# Patient Record
Sex: MALE | Employment: UNEMPLOYED | URBAN - METROPOLITAN AREA
[De-identification: names, ages, dates, MRNs, and addresses within clinical notes are randomized per-mention and may not be internally consistent; named-entity substitution may affect disease eponyms.]

---

## 2017-01-02 ENCOUNTER — TELEPHONE (OUTPATIENT)
Dept: SURGERY | Facility: CLINIC | Age: 62
End: 2017-01-02

## 2017-01-02 NOTE — TELEPHONE ENCOUNTER
Pre Visit Call and Assessment    Date of call:  01/02/2017    Phone numbers:  Home number on file [phone] (home)    Reached patient/confirmed appointment:  Yes  Patient care team/Primary provider:  Unknown, Doctor  Preferred outpatient pharmacy:    [facility] - [place], [place] - [address]  [address]  [address]  [facility] [address]  Phone: [phone] Fax: [phone]    Referred to:  Dr. Kennedy Leyva    Reason for visit: left inguinal hernia    Problem List:    Patient Active Problem List   Diagnosis     Obesity with body mass index 30 or greater     Acquired immunodeficiency syndrome (H)     Albuminuria     Anxiety disorder     Carpal tunnel syndrome     Cataract     Spinal stenosis of cervical region     Type 2 diabetes mellitus (H)     Impotence of organic origin     Hypertension     Hyperlipidemia     Insomnia     Neuropathy (H)     Adiposity     Postherpetic neuralgia     Renal insufficiency     Retrograde ejaculation     Pain in right hip     Shoulder pain     History of arthrodesis     History of total knee replacement     Sensorineural hearing loss     Major depressive episode     Sleep apnea     Vitamin D deficiency       Allergies:     Allergies   Allergen Reactions     Fenofibrate      Other reaction(s): Rhabdomyolysis     Simvastatin      Other reaction(s): Rhabdomyolysis     Amlodipine Swelling     Ciprofloxacin      Sitagliptin      Other reaction(s): Constipation       History:      Past Medical History   Diagnosis Date     HIV infection (H)      DM type 2 (diabetes mellitus, type 2) (H)      Renal insufficiency      Erectile dysfunction        No past surgical history on file.    No family history on file.    Social History   Substance Use Topics     Smoking status: Never Smoker      Smokeless tobacco: Not on file     Alcohol Use: No       Patient instructions:    *  Bring outside medical records, images/disc, and/or studies

## 2017-01-03 ENCOUNTER — OFFICE VISIT (OUTPATIENT)
Dept: SURGERY | Facility: CLINIC | Age: 62
End: 2017-01-03

## 2017-01-03 VITALS
BODY MASS INDEX: 30.9 KG/M2 | DIASTOLIC BLOOD PRESSURE: 78 MMHG | HEIGHT: 75 IN | WEIGHT: 248.5 LBS | SYSTOLIC BLOOD PRESSURE: 137 MMHG | HEART RATE: 69 BPM | TEMPERATURE: 98.4 F | OXYGEN SATURATION: 96 %

## 2017-01-03 DIAGNOSIS — K40.90 LEFT INGUINAL HERNIA: Primary | ICD-10-CM

## 2017-01-03 ASSESSMENT — ENCOUNTER SYMPTOMS
CHILLS: 1
EYE WATERING: 1
DISTURBANCES IN COORDINATION: 1
POLYPHAGIA: 1
EYE PAIN: 1
MUSCLE WEAKNESS: 1
PANIC: 1
EYE IRRITATION: 1
INSOMNIA: 1
HALLUCINATIONS: 0
FATIGUE: 1
NUMBNESS: 1
PARALYSIS: 1
DECREASED CONCENTRATION: 1
NERVOUS/ANXIOUS: 1
DOUBLE VISION: 1
ARTHRALGIAS: 1
DECREASED APPETITE: 0
SPEECH CHANGE: 1
HEADACHES: 1
MEMORY LOSS: 1
STIFFNESS: 1
INCREASED ENERGY: 1
MUSCLE CRAMPS: 1
ALTERED TEMPERATURE REGULATION: 1
WEIGHT GAIN: 0
NIGHT SWEATS: 1
JOINT SWELLING: 1
DIZZINESS: 1
NECK PAIN: 1
DEPRESSION: 1
WEIGHT LOSS: 0
MYALGIAS: 1
FEVER: 1
EYE REDNESS: 1
BACK PAIN: 1
POLYDIPSIA: 0

## 2017-01-03 ASSESSMENT — PAIN SCALES - GENERAL: PAINLEVEL: NO PAIN (0)

## 2017-01-03 NOTE — PROGRESS NOTES
Aguilar Sosa is a 61 year old male undergoing w/u by Dr Maya for infertility found to have a LIH.  Hernia is asymptomatic.  Finding was not work related.  Onset did not occur with lifting.  Obstructive symptoms:  no  Urinary difficulties:  no  Chronic cough: no  Constipation:  no  Current level of activity:  Low    Past medical and surgical history, medications, allergies, family history, and social history were reviewed with the patient.    ROS: 10 point review of systems negative except noted in HPI  PHYSICAL EXAM  General appearance- healthy, alert, and in no distress.  Skin- Skin color, texture, and turgor normal.   Neck- Neck is supple with no obvious adenopathy.  Lungs- Respiratory effort unlabored.  Gait- uses cane for stability.  Abdomen - over weight soft non tender  Left inguinal hernia, right without defect.    Impression:  Inguinal hernia, left  Recommendation:  Watching waiting, return to see me if symptoms.    A full discussion regarding the alternatives, risks, goals, and potential complications for this surgery was completed today.  The patient understood that the potential problems included but are not limited to:  Infection, bleeding, hematoma, seroma, recurrence, injury to nerve/muscle or involved testicle, ischemic orchitis, and chronic pain.    The patient verbally expressed understanding, was given the opportunity for questions, and wants to hold off on surgery. He will see me should he develop pain or other inguinal issues.    The total time spent with this patient was 30 minutes.  Of this time, greater than 50% was spent counseling and coordinating care.

## 2017-01-03 NOTE — NURSING NOTE
"Chief Complaint   Patient presents with     Consult     consult       Filed Vitals:    01/03/17 1446   BP: 137/78   Pulse: 69   Temp: 98.4  F (36.9  C)   Height: 6' 3\"   Weight: 248 lb 8 oz   SpO2: 96%       Body mass index is 31.06 kg/(m^2).      Jah Porter MA                            "

## 2017-01-03 NOTE — Clinical Note
1/3/2017       RE: Aguilar Sosa  [address]  [facility] [address]     Dear Colleague,    Thank you for referring your patient, Aguilar Sosa, to the GENERAL SURGERY at [facility] Please see a copy of my visit note below.    Aguilar Sosa is a 61 year old male undergoing w/u by Dr Maya for infertility found to have a LIH.  Hernia is asymptomatic.  Finding was not work related.  Onset did not occur with lifting.  Obstructive symptoms:  no  Urinary difficulties:  no  Chronic cough: no  Constipation:  no  Current level of activity:  Low    Past medical and surgical history, medications, allergies, family history, and social history were reviewed with the patient.    ROS: 10 point review of systems negative except noted in HPI  PHYSICAL EXAM  General appearance- healthy, alert, and in no distress.  Skin- Skin color, texture, and turgor normal.   Neck- Neck is supple with no obvious adenopathy.  Lungs- Respiratory effort unlabored.  Gait- uses cane for stability.  Abdomen - over weight soft non tender  Left inguinal hernia, right without defect.    Impression:  Inguinal hernia, left  Recommendation:  Watching waiting, return to see me if symptoms.    A full discussion regarding the alternatives, risks, goals, and potential complications for this surgery was completed today.  The patient understood that the potential problems included but are not limited to:  Infection, bleeding, hematoma, seroma, recurrence, injury to nerve/muscle or involved testicle, ischemic orchitis, and chronic pain.    The patient verbally expressed understanding, was given the opportunity for questions, and wants to hold off on surgery. He will see me should he develop pain or other inguinal issues.    The total time spent with this patient was 30 minutes.  Of this time, greater than 50% was spent counseling and coordinating care.          Again, thank you for allowing me to participate in the  care of your patient.      Sincerely,    Kennedy Leyva MD

## 2017-01-12 ENCOUNTER — PRE VISIT (OUTPATIENT)
Dept: UROLOGY | Facility: CLINIC | Age: 62
End: 2017-01-12

## 2017-01-12 NOTE — TELEPHONE ENCOUNTER
Patient coming in to discuss semen analysis and labs. Labs completed for visit. Patient last saw Dr Maya on 12/16/16 for fertility testing. Records in [facility]. No need to call patient

## 2017-01-19 ENCOUNTER — OFFICE VISIT (OUTPATIENT)
Dept: UROLOGY | Facility: CLINIC | Age: 62
End: 2017-01-19

## 2017-01-19 VITALS
HEART RATE: 78 BPM | WEIGHT: 248 LBS | HEIGHT: 75 IN | SYSTOLIC BLOOD PRESSURE: 132 MMHG | BODY MASS INDEX: 30.84 KG/M2 | DIASTOLIC BLOOD PRESSURE: 78 MMHG

## 2017-01-19 DIAGNOSIS — Z31.41 FERTILITY TESTING: Primary | ICD-10-CM

## 2017-01-19 ASSESSMENT — PAIN SCALES - GENERAL: PAINLEVEL: NO PAIN (0)

## 2017-01-19 NOTE — Clinical Note
"1/19/2017      RE: Aguilar Sosa  [address]  [facility] [address]       Urology Clinic Note      Date: 1/19/2017  Time: 12:27 PM  Patient: Aguilar Sosa  MRN: [id number]    HPI/Subjective:   Mr. Aguilar Sosa is a 61 year old male who is being seen for follow up for infertility, and mainly anejaculation.  He has HIV with history of AIDS, diabetes, ED and anejaculatory orgasm. He had SA with RA done 12/16/16 (no antegrade specimen) showing no sperm, and discussed with him that his hormonal panel normal (reviewed in depth today).    Spironolactone does not appear to be affecting his testosterone level.    He was started on sildenafil for ED, and is back today to discuss results of SA. Patient reports that since he started using sildenaful he have started seeing some ejaculate with the orgasm, and is pretty satisfied with the result, although he thinks the erections could be firmer.     Left inguinal hernia noted on last visit.    ED/Vascular disease risk factors:   HTN: yes   Hyperlipidemia: yes   Smoking: no   DM: yes, DM II   Cardiovascular disease: None known   Meds associated with ED that he's taking: spironolactone may affect testosterone levels.   Anxiety/anger/depression:  No   Penile Plaques or curvature:  none.     Objective:  /78 mmHg  Pulse 78  Ht 1.905 m (6' 3\")  Wt 112.492 kg (248 lb)  BMI 31.00 kg/m2  Gen: In NAD, conversant  Resp: Breathing non-labored  CV: Extremities warm.   exam deferred today.    Assessment & Plan: Aguilar Sosa is a 61 year old male with anejaculation and infertility, started to see some ejaculation with sildenafil.  - consider EEJ/ IUI, but neither likely an option based on cost/insurance coverage.  - discussed that ED And ejaculatory dysfunction are likely related to DM.  I do not think they have coverage for ( or funds for) ART.       - will do an SA now that he has ejaculate     - told patient that he can go up on the sildenafil up to 100 g to get " better results with erection    - patient would like to set up teaching for ICI         This patient was seen & discussed with Dr Francesco Freed MD  Urology PGY2  [phone]      CC: GABRIELA Connors      I saw and examined the patient with the resident today.  I agree with the resident note and plan of care as above.   25min visit, over 50% face to face in counseling/discussion of above issues: hormone lab results, semen analysis results, and plan/options going forward for fertility workup.    Marcello Maya MD  Urology Staff     Marcello Maya MD

## 2017-01-19 NOTE — PROGRESS NOTES
"Urology Clinic Note      Date: 1/19/2017  Time: 12:27 PM  Patient: Aguilar Sosa  MRN: [id number]    HPI/Subjective:   Mr. Aguilar Sosa is a 61 year old male who is being seen for follow up for infertility, and mainly anejaculation.  He has HIV with history of AIDS, diabetes, ED and anejaculatory orgasm. He had SA with RA done 12/16/16 (no antegrade specimen) showing no sperm, and discussed with him that his hormonal panel normal (reviewed in depth today).    Spironolactone does not appear to be affecting his testosterone level.    He was started on sildenafil for ED, and is back today to discuss results of SA. Patient reports that since he started using sildenaful he have started seeing some ejaculate with the orgasm, and is pretty satisfied with the result, although he thinks the erections could be firmer.     Left inguinal hernia noted on last visit.    ED/Vascular disease risk factors:   HTN: yes   Hyperlipidemia: yes   Smoking: no   DM: yes, DM II   Cardiovascular disease: None known   Meds associated with ED that he's taking: spironolactone may affect testosterone levels.   Anxiety/anger/depression:  No   Penile Plaques or curvature:  none.     Objective:  /78 mmHg  Pulse 78  Ht 1.905 m (6' 3\")  Wt 112.492 kg (248 lb)  BMI 31.00 kg/m2  Gen: In NAD, conversant  Resp: Breathing non-labored  CV: Extremities warm.   exam deferred today.    Assessment & Plan: Aguilar Sosa is a 61 year old male with anejaculation and infertility, started to see some ejaculation with sildenafil.  - consider EEJ/ IUI, but neither likely an option based on cost/insurance coverage.  - discussed that ED And ejaculatory dysfunction are likely related to DM.  I do not think they have coverage for ( or funds for) ART.       - will do an SA now that he has ejaculate     - told patient that he can go up on the sildenafil up to 100 g to get better results with erection    - patient would like to set up teaching for ICI     "     This patient was seen & discussed with Dr Francesco Freed MD  Urology PGY2  [phone]      CC: GABRIELA Connors      I saw and examined the patient with the resident today.  I agree with the resident note and plan of care as above.   25min visit, over 50% face to face in counseling/discussion of above issues: hormone lab results, semen analysis results, and plan/options going forward for fertility workup.    Marcello Maya MD  Urology Staff

## 2017-01-19 NOTE — Clinical Note
There are 4 clinics in the [facility] that do all aspects of advanced female infertility care:     1. [facility] - [place].  [email or web address].  Office in [place].     2. [facility] Reproductive Medicine [email or web address]  Offices in [facility].        3. [facility] for Reproductive Health [email or web address].  Office in [place].     4. [facility] [email or web address].  Office in [facility].

## 2017-01-19 NOTE — PATIENT INSTRUCTIONS
Schedule/complete semen analysis  Follow up with Lalitha Barrett for a injection teaching    It was a pleasure meeting with you today.  Thank you for allowing me and my team the privilege of caring for you today.  YOU are the reason we are here, and I truly hope we provided you with the excellent service you deserve.  Please let us know if there is anything else we can do for you so that we can be sure you are leaving completely satisfied with your care experience.

## 2017-01-19 NOTE — MR AVS SNAPSHOT
After Visit Summary   1/19/2017    Aguilar Sosa    MRN: [id number]           Patient Information     Date Of Birth          1955        Visit Information        Provider Department      1/19/2017 10:40 AM Marcello Maya MD [facility] Urology and [facility]        Today's Diagnoses     Fertility testing    -  1       Care Instructions    Schedule/complete semen analysis  Follow up with Lalitha Barrett for a injection teaching    It was a pleasure meeting with you today.  Thank you for allowing me and my team the privilege of caring for you today.  YOU are the reason we are here, and I truly hope we provided you with the excellent service you deserve.  Please let us know if there is anything else we can do for you so that we can be sure you are leaving completely satisfied with your care experience.                Follow-ups after your visit        Your next 10 appointments already scheduled     Jan 25, 2017 10:00 AM   (Arrive by 9:45 AM)   Return Visit with  Prostate Cancer Ctr Nurse   [facility] Urology and [facility] ([facility])    [address] [place] [id number] [phone]              Future tests that were ordered for you today     Open Future Orders        Priority Expected Expires Ordered    Semen/Sperm Cryo w/Morphology(ROSA) Routine  2/18/2017 1/19/2017    Retrograde Semen Analysis (ROSA) Routine 1/26/2017 3/21/2017 1/19/2017            Who to contact     Please call your clinic at [phone] to:    Ask questions about your health    Make or cancel appointments    Discuss your medicines    Learn about your test results    Speak to your doctor   If you have compliments or concerns about an experience at your clinic, or if you wish to file a complaint, please contact [facility] Patient Relations at [phone] or email us at  "[email or web address]         Additional Information About Your Visit        [employer] Information     S[employer]pping is an electronic gateway that provides easy, online access to your medical records. With Sift [employer]pping, you can request a clinic appointment, read your test results, renew a prescription or communicate with your care team.     To sign up for [employer]pping visit the website at [email or web address]   You will be asked to enter the access code listed below, as well as some personal information. Please follow the directions to create your username and password.     Your access code is: [email or web address]  Expires: 3/16/2017 11:39 AM     Your access code will  in 90 days. If you need help or a new code, please contact your [facility] or call [phone] for assistance.        Care EveryWhere ID     This is your Care EveryWhere ID. This could be used by other organizations to access your [place] medical records  [id number]        Your Vitals Were     Pulse Height BMI (Body Mass Index)             78 1.905 m (6' 3\") 31.00 kg/m2          Blood Pressure from Last 3 Encounters:   17 132/78   17 137/78   16 126/76    Weight from Last 3 Encounters:   17 112.492 kg (248 lb)   17 112.719 kg (248 lb 8 oz)   16 108.863 kg (240 lb)               Primary Care Provider    Doctor Unknown, MD       No address on file        Thank you!     Thank you for choosing [facility] UROLOGY AND [facility] FOR PROSTATE AND UROLOGIC CANCERS  for your care. Our goal is always to provide you with excellent care. Hearing back from our patients is one way we can continue to improve our services. Please take a few minutes to complete the written survey that you may receive in the mail after your visit with us. Thank you!             Your Updated Medication List - Protect others around you: Learn how to safely use, store and throw away your medicines at www.disposemymeds.org.    "       This list is accurate as of: 1/19/17 11:39 AM.  Always use your most recent med list.                   Brand Name Dispense Instructions for use    ACCU-CHEK PEDRITO PLUS test strip   Generic drug:  blood glucose monitoring          acetaminophen 325 MG tablet    TYLENOL     Take 650 mg by mouth       aspirin 81 MG EC tablet      Take 81 mg by mouth       atorvastatin 10 MG tablet    LIPITOR     Take 10 mg by mouth       blood glucose monitoring lancets          carvedilol 12.5 MG tablet    COREG     Take 12.5 mg by mouth       cholecalciferol 1000 UNIT tablet    vitamin D     Take 1,000 Units by mouth       dorzolamide-timolol 2-0.5 % ophthalmic solution    COSOPT         gabapentin 300 MG capsule    NEURONTIN     Take 900 mg by mouth       GENVOYA [id number] MG Tabs per tablet   Generic drug:  Xkdfcxd-Ifzxt-Yihfgllr-TenofAF          ibuprofen 400 MG tablet    ADVIL/MOTRIN     Take 400 mg by mouth       lisinopril 10 MG tablet    PRINIVIL/ZESTRIL     Take 10 mg by mouth       metFORMIN 1000 MG tablet    GLUCOPHAGE     Take 1,000 mg by mouth       oxyCODONE 5 MG IR tablet    ROXICODONE     Take 5 mg by mouth       sildenafil 20 MG tablet    REVATIO/VIAGRA    30 tablet    Take 3 tablets (60 mg) by mouth daily as needed May increase up to 100mg dose ( 5 tablets) if needed.  Use lowest effective dose.       spironolactone 25 MG tablet    ALDACTONE     Take 50 mg by mouth       tadalafil 20 MG tablet    CIALIS     Take 10 mg by mouth       TRIXAICIN 0.025 % cream   Generic drug:  capsicum oleoresin

## 2017-01-26 ENCOUNTER — ALLIED HEALTH/NURSE VISIT (OUTPATIENT)
Dept: UROLOGY | Facility: CLINIC | Age: 62
End: 2017-01-26

## 2017-01-26 DIAGNOSIS — N52.9 IMPOTENCE OF ORGANIC ORIGIN: Primary | ICD-10-CM

## 2017-01-26 NOTE — MR AVS SNAPSHOT
After Visit Summary   1/26/2017    Aguilar Sosa    MRN: [id number]           Patient Information     Date Of Birth          1955        Visit Information        Provider Department      1/26/2017 9:30 AM Nurse, [facility] Urology and [facility] for Prostate and Urologic Cancers        Today's Diagnoses     Impotence of organic origin    -  1       Care Instructions    Please call to let me know how long the erection lasted     A prescription will be sent to [facility] as discussed    Lalitha Barrett, RN   Care Coordinator Urology          Follow-ups after your visit        Follow-up notes from your care team     Return in about 4 weeks (around 2/23/2017) for review results of semen analysis and see how injection teaching is going.      Your next 10 appointments already scheduled     Jan 30, 2017 11:00 AM   LAB with UR ANDROLOGY LAB   [facility] ([facility])    [address] [place] [phone]              Patient must bring picture ID.  Patient should be prepared to give a urine specimen  Please do not eat 10-12 hours before your appointment if you are coming in fasting for labs on lipids, cholesterol, or glucose (sugar).  Pregnant women should follow their Care Team instructions. Water with medications is okay. Do not drink coffee or other fluids.   If you have concerns about taking  your medications, please ask at office or if scheduling via [employer], send a message by clicking on Secure Messaging, Message Your Care Team.              Who to contact     Please call your clinic at [phone] to:    Ask questions about your health    Make or cancel appointments    Discuss your medicines    Learn about your test results    Speak to your doctor   If you have compliments or concerns about an experience at your clinic, or if you wish to file a complaint, please contact [facility]  Physicians Patient Relations at [phone] or email us at [email or web address]         Additional Information About Your Visit        [employer] Information     HQ plus is an electronic gateway that provides easy, online access to your medical records. With HQ plus, you can request a clinic appointment, read your test results, renew a prescription or communicate with your care team.     To sign up for HQ plus visit the website at [email or web address]   You will be asked to enter the access code listed below, as well as some personal information. Please follow the directions to create your username and password.     Your access code is: [email or web address]  Expires: 3/16/2017 11:39 AM     Your access code will  in 90 days. If you need help or a new code, please contact your [facility] or call [phone] for assistance.        Care EveryWhere ID     This is your Care EveryWhere ID. This could be used by other organizations to access your [place] medical records  [id number]         Blood Pressure from Last 3 Encounters:   17 132/78   17 137/78   16 126/76    Weight from Last 3 Encounters:   17 112.492 kg (248 lb)   17 112.719 kg (248 lb 8 oz)   16 108.863 kg (240 lb)              We Performed the Following     INJECTION CORPORA CAVERNOSA W PHRM AGNT        Primary Care Provider    Doctor Unknown, MD       No address on file        Thank you!     Thank you for choosing [facility] UROLOGY AND [facility] FOR PROSTATE AND UROLOGIC CANCERS  for your care. Our goal is always to provide you with excellent care. Hearing back from our patients is one way we can continue to improve our services. Please take a few minutes to complete the written survey that you may receive in the mail after your visit with us. Thank you!             Your Updated Medication List - Protect others around you: Learn how to safely use, store and throw away your  medicines at www.disposemymeds.org.          This list is accurate as of: 1/26/17 11:59 PM.  Always use your most recent med list.                   Brand Name Dispense Instructions for use    ACCU-CHEK PEDRITO PLUS test strip   Generic drug:  blood glucose monitoring          acetaminophen 325 MG tablet    TYLENOL     Take 650 mg by mouth       aspirin 81 MG EC tablet      Take 81 mg by mouth       atorvastatin 10 MG tablet    LIPITOR     Take 10 mg by mouth       blood glucose monitoring lancets          carvedilol 12.5 MG tablet    COREG     Take 12.5 mg by mouth       cholecalciferol 1000 UNIT tablet    vitamin D     Take 1,000 Units by mouth       dorzolamide-timolol 2-0.5 % ophthalmic solution    COSOPT         gabapentin 300 MG capsule    NEURONTIN     Take 900 mg by mouth       GENVOYA [id number] MG Tabs per tablet   Generic drug:  Kctpkql-Lvugo-Cttqrzfx-TenofAF          ibuprofen 400 MG tablet    ADVIL/MOTRIN     Take 400 mg by mouth       lisinopril 10 MG tablet    PRINIVIL/ZESTRIL     Take 10 mg by mouth       metFORMIN 1000 MG tablet    GLUCOPHAGE     Take 1,000 mg by mouth       oxyCODONE 5 MG IR tablet    ROXICODONE     Take 5 mg by mouth       sildenafil 20 MG tablet    REVATIO/VIAGRA    30 tablet    Take 3 tablets (60 mg) by mouth daily as needed May increase up to 100mg dose ( 5 tablets) if needed.  Use lowest effective dose.       spironolactone 25 MG tablet    ALDACTONE     Take 50 mg by mouth       tadalafil 20 MG tablet    CIALIS     Take 10 mg by mouth       TRIXAICIN 0.025 % cream   Generic drug:  capsicum oleoresin

## 2017-01-27 NOTE — PATIENT INSTRUCTIONS
Please call to let me know how long the erection lasted     A prescription will be sent to [facility] as discussed    Lalitha Barrett, RITU   Care Coordinator Urology

## 2017-01-27 NOTE — NURSING NOTE
Chief Complaint   Patient presents with     Clinic Care Coordination - Face To Face     injection teaching       Patient Active Problem List   Diagnosis     Obesity with body mass index 30 or greater     Acquired immunodeficiency syndrome (H)     Albuminuria     Anxiety disorder     Carpal tunnel syndrome     Cataract     Spinal stenosis of cervical region     Type 2 diabetes mellitus (H)     Impotence of organic origin     Hypertension     Hyperlipidemia     Insomnia     Neuropathy (H)     Adiposity     Postherpetic neuralgia     Renal insufficiency     Retrograde ejaculation     Pain in right hip     Shoulder pain     History of arthrodesis     History of total knee replacement     Sensorineural hearing loss     Major depressive episode     Sleep apnea     Vitamin D deficiency       Allergies   Allergen Reactions     Fenofibrate      Other reaction(s): Rhabdomyolysis     Simvastatin      Other reaction(s): Rhabdomyolysis     Amlodipine Swelling     Ciprofloxacin      Sitagliptin      Other reaction(s): Constipation       Current Outpatient Prescriptions   Medication Sig Dispense Refill     blood glucose monitoring (ACCU-CHEK PEDRITO PLUS) test strip        blood glucose monitoring (SOFTCLIX) lancets        acetaminophen (TYLENOL) 325 MG tablet Take 650 mg by mouth       aspirin 81 MG EC tablet Take 81 mg by mouth       atorvastatin (LIPITOR) 10 MG tablet Take 10 mg by mouth       carvedilol (COREG) 12.5 MG tablet Take 12.5 mg by mouth       cholecalciferol (VITAMIN D3) 1000 UNIT tablet Take 1,000 Units by mouth       dorzolamide-timolol (COSOPT) 2-0.5 % ophthalmic solution        gabapentin (NEURONTIN) 300 MG capsule Take 900 mg by mouth       GENVOYA 041-125-580-10 mg tablet        ibuprofen (ADVIL/MOTRIN) 400 MG tablet Take 400 mg by mouth       lisinopril (PRINIVIL/ZESTRIL) 10 MG tablet Take 10 mg by mouth       metFORMIN (GLUCOPHAGE) 1000 MG tablet Take 1,000 mg by mouth       oxyCODONE (ROXICODONE) 5 MG IR  tablet Take 5 mg by mouth       spironolactone (ALDACTONE) 25 MG tablet Take 50 mg by mouth       tadalafil (CIALIS) 20 MG tablet Take 10 mg by mouth       capsicum oleoresin (TRIXAICIN) 0.025 % cream        sildenafil (REVATIO/VIAGRA) 20 MG tablet Take 3 tablets (60 mg) by mouth daily as needed May increase up to 100mg dose ( 5 tablets) if needed.  Use lowest effective dose. 30 tablet 12       Social History   Substance Use Topics     Smoking status: Never Smoker      Smokeless tobacco: Not on file     Alcohol Use: No       Aguilar Sosa presents to clinic for Edex injection teaching.  Edex video watched in clinic by patient.  Reviewed consent and possible side effects.  Questions answered appropriately.  Patient injected 10 mcg with  assistance.  Patient rates his erection a 9/10.  Patient to call if he has any questions or concerns.  Prescription for Edex 10 mcg .  Aguilar Sosa comes into clinic today at the request of Marcello Maya .        This service provided today was under the direct supervision of dr Marcello Maya, who was available if needed.    Lalitha Barrett, RN    Lalitha Barrett, RN  1/27/2017  6:54 AM

## 2017-01-30 DIAGNOSIS — B20 HUMAN IMMUNODEFICIENCY VIRUS (HIV) DISEASE (H): ICD-10-CM

## 2017-01-30 DIAGNOSIS — N52.8 OTHER MALE ERECTILE DYSFUNCTION: ICD-10-CM

## 2017-01-30 DIAGNOSIS — B20: ICD-10-CM

## 2017-01-30 DIAGNOSIS — Z31.41 FERTILITY TESTING: ICD-10-CM

## 2017-01-30 DIAGNOSIS — N53.19 ANEJACULATION: ICD-10-CM

## 2017-01-30 DIAGNOSIS — K40.90 LEFT INGUINAL HERNIA: ICD-10-CM

## 2017-01-30 PROCEDURE — 89331 RETROGRADE EJACULATION ANAL: CPT

## 2017-01-30 PROCEDURE — 89322 SEMEN ANAL STRICT CRITERIA: CPT

## 2017-02-01 ENCOUNTER — TELEPHONE (OUTPATIENT)
Dept: UROLOGY | Facility: CLINIC | Age: 62
End: 2017-02-01

## 2017-02-03 LAB
ABNORMAL SPERM: 100 MORPHOLOGY
ABNORMAL SPERM: ABNORMAL MORPHOLOGY
ABSTINENCE DAYS: 10 DAYS (ref 2–7)
ABSTINENCE DAYS: 10 DAYS (ref 2–7)
AGGLUTINATION: ABNORMAL YES/NO
AGGLUTINATION: ABNORMAL YES/NO
ANALYSIS TEMP - CENTIGRADE: 22 CENTIGRADE
ANALYSIS TEMP - CENTIGRADE: 22 CENTIGRADE
CELL FRAGMENTS: 29 %
CELL FRAGMENTS: ABNORMAL %
COLLECTION METHOD: ABNORMAL
COLLECTION METHOD: ABNORMAL
COLLECTION SITE: ABNORMAL
COLLECTION SITE: ABNORMAL
CONSENT TO RELEASE TO PARTNER: YES
CONSENT TO RELEASE TO PARTNER: YES
HEAD DEFECT: 99
HEAD DEFECT: ABNORMAL
IMMATURE SPERM: 25 %
IMMATURE SPERM: ABNORMAL %
IMMOTILE: 86 %
IMMOTILE: 93 %
LAB RECEIPT TIME: ABNORMAL
LAB RECEIPT TIME: ABNORMAL
LIQUEFIED: ABNORMAL YES/NO
MIDPIECE DEFECT: 38
MIDPIECE DEFECT: ABNORMAL
NON-PROGRESSIVE MOTILITY: 4 %
NON-PROGRESSIVE MOTILITY: 5 %
NORMAL SPERM: 0 % NORMAL FORMS (ref 4–?)
NORMAL SPERM: ABNORMAL % NORMAL FORMS (ref 4–?)
PROGRESSIVE MOTILITY: 3 % (ref 32–?)
PROGRESSIVE MOTILITY: 9 % (ref 32–?)
ROUND CELLS: 3.4 MILLION/ML (ref ?–2)
ROUND CELLS: <0.1 MILLION/ML (ref ?–2)
SPECIMEN CONCENTRATION: 10.2 MILLION/ML (ref 15–?)
SPECIMEN CONCENTRATION: 555 MILLION/ML (ref 15–?)
SPECIMEN PH: 6.8 PH (ref 7.2–?)
SPECIMEN PH: 7.2 PH (ref 7.2–?)
SPECIMEN TYPE: ABNORMAL
SPECIMEN TYPE: ABNORMAL
SPECIMEN VOL UR: 0.7 ML (ref 1.5–?)
SPECIMEN VOL UR: 1 ML (ref 1.5–?)
TAIL DEFECT: 11
TAIL DEFECT: ABNORMAL
TIME OF ANALYSIS: ABNORMAL
TIME OF ANALYSIS: ABNORMAL
TOTAL NUMBER: 10.2 MILLION (ref 39–?)
TOTAL NUMBER: 388.5 MILLION (ref 39–?)
TOTAL PROGRESSIVE MOTILE: 0.31 MILLION (ref 15.6–?)
TOTAL PROGRESSIVE MOTILE: 35 MILLION (ref 15.6–?)
VISCOUS: ABNORMAL YES/NO
VITALITY: 21 % (ref 58–?)
VITALITY: ABNORMAL % (ref 58–?)
WBC SPECIMEN: 46 %
WBC SPECIMEN: ABNORMAL %

## 2017-02-06 ENCOUNTER — PRE VISIT (OUTPATIENT)
Dept: UROLOGY | Facility: CLINIC | Age: 62
End: 2017-02-06

## 2017-02-06 NOTE — TELEPHONE ENCOUNTER
Patient coming in for semen analysis results. SA completed 1/30/17. Records/orders in [facility]. No need to call patient

## 2017-02-09 ENCOUNTER — OFFICE VISIT (OUTPATIENT)
Dept: UROLOGY | Facility: CLINIC | Age: 62
End: 2017-02-09

## 2017-02-09 VITALS
WEIGHT: 248 LBS | DIASTOLIC BLOOD PRESSURE: 81 MMHG | SYSTOLIC BLOOD PRESSURE: 130 MMHG | HEART RATE: 68 BPM | BODY MASS INDEX: 31 KG/M2

## 2017-02-09 DIAGNOSIS — N52.9 ERECTILE DYSFUNCTION, UNSPECIFIED ERECTILE DYSFUNCTION TYPE: Primary | ICD-10-CM

## 2017-02-09 RX ORDER — IBUPROFEN 200 MG
TABLET ORAL
Qty: 20 EACH | Status: SHIPPED | OUTPATIENT
Start: 2017-02-09

## 2017-02-09 RX ORDER — SILDENAFIL CITRATE 20 MG/1
20-40 TABLET ORAL DAILY PRN
Qty: 30 TABLET | Refills: 12 | Status: SHIPPED | OUTPATIENT
Start: 2017-02-09

## 2017-02-09 RX ORDER — SILDENAFIL 100 MG/1
100 TABLET, FILM COATED ORAL DAILY PRN
Qty: 24 TABLET | Refills: 20 | Status: SHIPPED | OUTPATIENT
Start: 2017-02-09

## 2017-02-09 ASSESSMENT — PAIN SCALES - GENERAL: PAINLEVEL: NO PAIN (0)

## 2017-02-09 NOTE — PATIENT INSTRUCTIONS
Follow up in 1 year with Dr Maya    It was a pleasure meeting with you today.  Thank you for allowing me and my team the privilege of caring for you today.  YOU are the reason we are here, and I truly hope we provided you with the excellent service you deserve.  Please let us know if there is anything else we can do for you so that we can be sure you are leaving completely satisfied with your care experience.

## 2017-02-09 NOTE — PROGRESS NOTES
Quick Note:    Please notify pt of these results.  He did not have retrograde ejaculation.  Hid did have a lot of live sperm in the ejaculate- very adequate for IUI (intrauterine inseminations) with a female fertility specialist.  The percent of live sperm were lower, probably due to infrequent ejaculation.  We can consider a trans-rectal ultrasound also in clinic to evaluate for ejaculatory duct obstruction.    - thanks.  CHELLY      ______

## 2017-02-09 NOTE — PROGRESS NOTES
It was a pleasure to see Mr. Sosa back in Urology Clinic today.  He is a very nice, 61-year-old male with ejaculatory dysfunction secondary to diabetes.  He was able to ejaculate with the help of Viagra 100 mg.  He had a coupon initially for the generic version, and refills of this were prohibitively expensive.  I gave him prescriptions again with a coupon and also a 100 mg tablet prescription; he can consider sending this to an overseas pharmacy in [place].  He had some luck with the injectable Edex as well.  I also provided him with a generic prescription for this.  He can start at 0.3-0.4 mL and titrate up if needed.  He was able to do an antegrade semen analysis with the Viagra.  He lost most of the volume, but he did collect 0.7 mL.  This had a high concentration of 555 million per mL, low vitality and low motility, but total progressive motile was 35 million.  Morphology was zero.  There was not any evidence of any significant retrograde ejaculation.      I discussed with the couple that they may be able to have intercourse and achieve a pregnancy versus IUI, which is another good option.  She is going to follow up with [facility] on Monday toward this goal.  I advised female partner workup because of age 36.  Also, if they are interested in insemination, that would be a good idea/option.      I discussed with him again Viagra, including side effects and how to take it.  I discussed with him the intracavernosal injections and how to dose this and to use the lowest effective dose.  I would see him back in a year, sooner if needed.  It sounds like he was able to have a normal ejaculate volume, but just did not collect all that volume on the last semen analysis, so I feel that his ejaculatory dysfunction is secondary to diabetes more than anything, rather than ejaculatory duct obstruction.  In that case, we will hold off on a transrectal ultrasound.     15min visit, over 50% face to face in counseling/discussion of  erectile dysfunction/fertility.    Marcello Maya MD

## 2017-03-13 NOTE — TELEPHONE ENCOUNTER
[facility] Prior Authorization Team   Phone: [phone]  Fax: [phone]      PA Initiation    Medication: alprostadil (EDEX) 10 MCG kit  Insurance Company: [employer] - Phone [phone] Fax [phone]  Pharmacy Filling the Rx: "[facility] [id number] [place] [address] AT SEC OF [facility] [place]  Filling Pharmacy Phone: [phone]  Filling Pharmacy Fax: [phone]  Start Date: 3/13/2017

## 2017-03-14 ENCOUNTER — TRANSFERRED RECORDS (OUTPATIENT)
Dept: HEALTH INFORMATION MANAGEMENT | Facility: CLINIC | Age: 62
End: 2017-03-14

## 2017-03-16 NOTE — TELEPHONE ENCOUNTER
PRIOR AUTHORIZATION DENIED    Medication: alprostadil (EDEX) 10 MCG kit    Denial Date: 3/16/2017    Denial Rational:   PA denied stating ED drugs are excluded from coverage. An appeal is available and will require a letter of medical necessity.         Appeal Information:

## 2017-03-21 ENCOUNTER — TELEPHONE (OUTPATIENT)
Dept: UROLOGY | Facility: CLINIC | Age: 62
End: 2017-03-21

## 2017-03-21 NOTE — TELEPHONE ENCOUNTER
----- Message from Marcello Maya MD sent at 3/20/2017  2:18 PM CDT -----  Not sure who he talked to so far.  He can get another opinion:    There are 4 clinics in the [facility] that do all aspects of advanced female infertility care:     1. [facility] [place].  [email or web address].  Office in [place].     2. [email or web address]  Offices in [place] and [place].        3. [email or web address].  Office in [place].     4. [facility] [email or web address].  Office in [facility].       ----- Message -----     From: Lalitha Barrett RN     Sent: 3/20/2017   1:11 PM       To: Marcello Maya MD    Nir   Patient is calling to say he was told not use his sperm due to his HIV status. He is upset and wants another MD or clinic he can go to  Thoughts?  Lalitha

## 2017-12-18 ENCOUNTER — TRANSFERRED RECORDS (OUTPATIENT)
Dept: HEALTH INFORMATION MANAGEMENT | Facility: CLINIC | Age: 62
End: 2017-12-18

## 2018-02-19 ENCOUNTER — TRANSFERRED RECORDS (OUTPATIENT)
Dept: HEALTH INFORMATION MANAGEMENT | Facility: CLINIC | Age: 63
End: 2018-02-19

## 2018-10-11 ENCOUNTER — TRANSFERRED RECORDS (OUTPATIENT)
Dept: HEALTH INFORMATION MANAGEMENT | Facility: CLINIC | Age: 63
End: 2018-10-11

## 2018-11-20 ENCOUNTER — TRANSFERRED RECORDS (OUTPATIENT)
Dept: HEALTH INFORMATION MANAGEMENT | Facility: CLINIC | Age: 63
End: 2018-11-20

## 2019-08-06 ENCOUNTER — TRANSFERRED RECORDS (OUTPATIENT)
Dept: HEALTH INFORMATION MANAGEMENT | Facility: CLINIC | Age: 64
End: 2019-08-06

## 2019-11-21 ENCOUNTER — TRANSFERRED RECORDS (OUTPATIENT)
Dept: HEALTH INFORMATION MANAGEMENT | Facility: CLINIC | Age: 64
End: 2019-11-21

## 2019-11-21 ENCOUNTER — MEDICAL CORRESPONDENCE (OUTPATIENT)
Dept: HEALTH INFORMATION MANAGEMENT | Facility: CLINIC | Age: 64
End: 2019-11-21

## 2019-12-09 ENCOUNTER — TRANSFERRED RECORDS (OUTPATIENT)
Dept: HEALTH INFORMATION MANAGEMENT | Facility: CLINIC | Age: 64
End: 2019-12-09

## 2019-12-18 ENCOUNTER — TELEPHONE (OUTPATIENT)
Dept: NEUROSURGERY | Facility: CLINIC | Age: 64
End: 2019-12-18

## 2019-12-18 NOTE — TELEPHONE ENCOUNTER
[facility] Call Center    Phone Message    May a detailed message be left on voicemail: yes    Reason for Call: pt calling about the referral that his dr sent over. Pt was referred from Dr Clarence Anaya at [facility]. Please call to schedule    Action Taken: Message routed to:  [facility] Neurosurgery

## 2019-12-19 NOTE — TELEPHONE ENCOUNTER
Faxed request for records and imaging to [facility]. No referral or diagnosis is indicated in Epic.

## 2019-12-24 NOTE — TELEPHONE ENCOUNTER
[facility] Call Center    Phone Message    May a detailed message be left on voicemail: yes    Reason for Call: Other: pt waiting for cb to sched. please call pt back     Action Taken: Message routed to:  [facility] neurosurg

## 2019-12-27 NOTE — TELEPHONE ENCOUNTER
[facility] Call Center    Phone Message    May a detailed message be left on voicemail: yes    Reason for Call: Other: Pt called to f/u on message sent over on 12/24 to schedule an appt. Requesting call back     Action Taken: Message routed to:  [facility] neurosurg

## 2020-02-06 ENCOUNTER — TELEPHONE (OUTPATIENT)
Dept: NEUROSURGERY | Facility: CLINIC | Age: 65
End: 2020-02-06

## 2020-02-06 NOTE — TELEPHONE ENCOUNTER
[facility] Call Center    Phone Message    May a detailed message be left on voicemail: yes     Reason for Call: Other:     Pt is calling in to check if imaging was received and to schedule his appt.  Please call him back.       Action Taken: Message routed to:  [facility] NeuroSurgery    Travel Screening: Not Applicable

## 2020-03-17 ENCOUNTER — TELEPHONE (OUTPATIENT)
Dept: NEUROSURGERY | Facility: CLINIC | Age: 65
End: 2020-03-17

## 2020-03-17 NOTE — TELEPHONE ENCOUNTER
Sent to Neurosurgery Clinic Scheduling to reschedule postponed appointment as directed. Patient notified of appointment for 03/17/2020 Postponed     Writer call patient to inform appointment will be rescheduled at a later date due to Covid 19. Patient was kind an appreciated the call.

## 2020-03-30 ENCOUNTER — DOCUMENTATION ONLY (OUTPATIENT)
Dept: CARE COORDINATION | Facility: CLINIC | Age: 65
End: 2020-03-30

## 2020-04-30 ENCOUNTER — TELEPHONE (OUTPATIENT)
Dept: NEUROSURGERY | Facility: CLINIC | Age: 65
End: 2020-04-30

## 2020-04-30 NOTE — TELEPHONE ENCOUNTER
[facility] Call Center    Phone Message    May a detailed message be left on voicemail: yes     Reason for Call: Other: Pt called in to check his appt date and time, I offered to change his appt to video or telephone, pt declined and wants to come in.     Action Taken: Message routed to:  [facility] Neurosurgery    Travel Screening: Not Applicable

## 2020-05-26 ENCOUNTER — VIRTUAL VISIT (OUTPATIENT)
Dept: NEUROSURGERY | Facility: CLINIC | Age: 65
End: 2020-05-26
Payer: COMMERCIAL

## 2020-05-26 DIAGNOSIS — M54.2 NECK PAIN: ICD-10-CM

## 2020-05-26 NOTE — PROGRESS NOTES
Service Date: 2020      Referring physician      RE: Aguilar Sosa    MRN: [id number]   : 1955      Dear Doctor:       Mr. Sosa had a phone conversation with me today in clinic 2020 due to the COVID-19 virus.  He declined a video visit and preferred a telephone visit.  This telephone visit took approximately 15 minutes.      This is a gentleman, HIV positive, who is 64 years old.  He has multiple comorbidities.  He also is Slovak speaking, and it is extremely difficult to understand him on the telephone.  At one point he requested an ; however, it was impossible to scrounge one up at this late time period.  He was very aggressive and hard to speak to.  He repeatedly insisted I should have telephoned his other physicians beforehand, which, of course, is impractical.  He would not answer any questions directly.      In summary, he is complaining of neck and arm pain, but he also says that his laminectomy that he had in  had ruined his life, and he repeatedly insisted that he had a foreign body in his neck that he had pictures of.  I attempted to review all of his imaging, and I really have no idea what he is referring to.      More useful was Dr. Morejon's note from 2019, which I also reviewed.  He stated, also, that Mr. Sosa was combative and that he had seen him 2 times.  He stated that he was having some gait difficulties, whole body pain, 4/5 strength in his right upper extremity and 5/5 in the left upper extremity.  Dr. Morejon also found right hip flexor weakness 2/5 and foraminal stenosis at the L4-5 and L5-S1 on lumbar spine.  Today I only had an MRI of his cervical spine to review, and that is what I reviewed.  He has some foraminal stenosis at multiple levels throughout his cervical spine, but his spinal cord is well decompressed.  I do not see any instrumentation.  He does, however, have severe myelomalacia throughout his cervical spine.      It was therefore  very important for me to discern whether his symptoms have gotten worse since the immediate time after his surgery.  When asked specifically whether his symptoms have gotten worse over the last 2 years, I do not elicit a positive response from him.  Therefore, I cannot be certain if these symptoms are actually from his myelomalacia and there is nothing further to do.      Dr. Morejon also did not think he is a very good surgical candidate.  The only new thing since he has seen Dr. Morejon is the nerve conduction study of his upper extremities.  This was performed in December of last year and showed bilateral carpal tunnel syndrome, and of note, he has had previous right carpal tunnel release in .  It also showed some chronic inactive denervation at C5, 6, and 8.  The only thing of surgical interest was that he appears to have a right C7 ongoing denervation.  He does have some foraminal stenosis at that level, so therefore we could consider him for a C6-7 cervical diskectomy and fusion.      However, given his multiple comorbidities and his clear inability to grasp anything that I am trying to tell him, his perseveration on the idea that there is a foreign body in his neck, his prior history (from chart) of disorientation and confusion, I would be unable to obtain any kind of informed consent from this gentleman.  Therefore, I do not think that I would be able to offer him any surgical intervention.      Thank you for the referral.      Sincerely,      MD MARK King MD             D: 2020   T: 2020   MT: salas      Name:     MIRIAM MALLOY   MRN:      [id number]        Account:      [id number]   :      1955           Service Date: 2020      Document: [id number]

## 2020-05-26 NOTE — LETTER
"2020       RE: Aguilar Sosa  [address]  [facility] [address]     Dear Colleague,    Thank you for referring your patient, Aguilar Sosa, to the [facility] NEUROSURGERY at [facility]. Please see a copy of my visit note below.    Aguilar Sosa is a 64 year old male who is being evaluated via a billable telephone visit.      The patient has been notified of following:     \"This telephone visit will be conducted via a call between you and your physician/provider. We have found that certain health care needs can be provided without the need for a physical exam.  This service lets us provide the care you need with a short phone conversation.  If a prescription is necessary we can send it directly to your pharmacy.  If lab work is needed we can place an order for that and you can then stop by our lab to have the test done at a later time.    Telephone visits are billed at different rates depending on your insurance coverage. During this emergency period, for some insurers they may be billed the same as an in-person visit.  Please reach out to your insurance provider with any questions.    If during the course of the call the physician/provider feels a telephone visit is not appropriate, you will not be charged for this service.\"    Patient has given verbal consent for Telephone visit?  YES    What phone number would you like to be contacted at? [phone]    How would you like to obtain your AVS? Mail    DEAN Michael          Service Date: 2020      Referring physician      RE: Aguilar Sosa    MRN: [id number]   : 1955      Dear Doctor:       Mr. Sosa had a phone conversation with me today in clinic 2020 due to the COVID-19 virus.  He declined a video visit and preferred a telephone visit.  This telephone visit took approximately 15 minutes.      This is a gentleman, HIV positive, who is 64 years old.  He has multiple comorbidities.  He " also is Mauritian speaking, and it is extremely difficult to understand him on the telephone.  At one point he requested an ; however, it was impossible to scrounge one up at this late time period.  He was very aggressive and hard to speak to.  He repeatedly insisted I should have telephoned his other physicians beforehand, which, of course, is impractical.  He would not answer any questions directly.      In summary, he is complaining of neck and arm pain, but he also says that his laminectomy that he had in 2014 had ruined his life, and he repeatedly insisted that he had a foreign body in his neck that he had pictures of.  I attempted to review all of his imaging, and I really have no idea what he is referring to.      More useful was Dr. Morejon's note from 08/2019, which I also reviewed.  He stated, also, that Mr. Sosa was combative and that he had seen him 2 times.  He stated that he was having some gait difficulties, whole body pain, 4/5 strength in his right upper extremity and 5/5 in the left upper extremity.  Dr. Morejon also found right hip flexor weakness 2/5 and foraminal stenosis at the L4-5 and L5-S1 on lumbar spine.  Today I only had an MRI of his cervical spine to review, and that is what I reviewed.  He has some foraminal stenosis at multiple levels throughout his cervical spine, but his spinal cord is well decompressed.  I do not see any instrumentation.  He does, however, have severe myelomalacia throughout his cervical spine.      It was therefore very important for me to discern whether his symptoms have gotten worse since the immediate time after his surgery.  When asked specifically whether his symptoms have gotten worse over the last 2 years, I do not elicit a positive response from him.  Therefore, I cannot be certain if these symptoms are actually from his myelomalacia and there is nothing further to do.      Dr. Morejon also did not think he is a very good surgical candidate.   The only new thing since he has seen Dr. Morejon is the nerve conduction study of his upper extremities.  This was performed in December of last year and showed bilateral carpal tunnel syndrome, and of note, he has had previous right carpal tunnel release in .  It also showed some chronic inactive denervation at C5, 6, and 8.  The only thing of surgical interest was that he appears to have a right C7 ongoing denervation.  He does have some foraminal stenosis at that level, so therefore we could consider him for a C6-7 cervical diskectomy and fusion.      However, given his multiple comorbidities and his clear inability to grasp anything that I am trying to tell him, his perseveration on the idea that there is a foreign body in his neck, his prior history (from chart) of disorientation and confusion, I would be unable to obtain any kind of informed consent from this gentleman.  Therefore, I do not think that I would be able to offer him any surgical intervention.      Thank you for the referral.      Sincerely,      Tawanna Sotelo MD                 D: 2020   T: 2020   MT: salas      Name:     MIRIAM MALLOY   MRN:      [id number]        Account:      [id number]   :      1955           Service Date: 2020      Document: [id number]

## 2020-05-26 NOTE — PROGRESS NOTES
"Aguilar Sosa is a 64 year old male who is being evaluated via a billable telephone visit.      The patient has been notified of following:     \"This telephone visit will be conducted via a call between you and your physician/provider. We have found that certain health care needs can be provided without the need for a physical exam.  This service lets us provide the care you need with a short phone conversation.  If a prescription is necessary we can send it directly to your pharmacy.  If lab work is needed we can place an order for that and you can then stop by our lab to have the test done at a later time.    Telephone visits are billed at different rates depending on your insurance coverage. During this emergency period, for some insurers they may be billed the same as an in-person visit.  Please reach out to your insurance provider with any questions.    If during the course of the call the physician/provider feels a telephone visit is not appropriate, you will not be charged for this service.\"    Patient has given verbal consent for Telephone visit?  YES    What phone number would you like to be contacted at? [phone]    How would you like to obtain your AVS? Mail    Doreen Rojo EMT        "

## 2021-05-12 NOTE — TELEPHONE ENCOUNTER
----- Message from Mary Messer sent at 5/12/2021  4:04 PM EDT -----  Contact: [phone]  Ysabel POWER, from [facility]. # [phone] called for advice regarding pt on what to do till pt can get here. Pt is still passing blood and vomitting.       Information given to the patient to call to set up second opinion for IUI   The physician in [place] said no due to his HIV status     Lalitha Barrett, RN   Care Coordinator Urology